# Patient Record
Sex: FEMALE | Race: WHITE | NOT HISPANIC OR LATINO | URBAN - METROPOLITAN AREA
[De-identification: names, ages, dates, MRNs, and addresses within clinical notes are randomized per-mention and may not be internally consistent; named-entity substitution may affect disease eponyms.]

---
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
## 2024-03-27 NOTE — ED PEDIATRIC NURSE NOTE - EENT WDL
HPI:  39-year-old female presents for left knee pain.  States she was walking down some stairs and thinks she might of twisted.  States did not fall or any other known injury.  Is able to walk on it despite the pain.  Denies any weakness or paresthesias.    Physical Exam:   GEN: Vitals noted. NAD  EYES:  EOMs grossly intact, anicteric sclera  RICHI: Mucosa moist.  NECK: Supple.  CARD: RRR  PULMONARY: Moving air well. Clear all lung fields.  ABDOMEN: Soft, no guarding, no rigidity. Nontender. NABS  EXTREMITIES: Full ROM, no pitting edema, mild diffuse tenderness to the medial aspect of the left knee with no erythema ecchymosis swelling or warmth, 2+ pedal pulses brisk capillary fill with intact station strength.  Negative ligamentous test  SKIN: Intact, warm and dry  NEURO: Alert and oriented x 3, speech is clear, no obvious deficits noted.       ----------------------------------------------------------------------------------------------------------------------------    MDM:  47-year-old female presenting for left knee pain after twisting injury yesterday.  On exam she is well-appearing ambulating without difficulty.  Vital signs stable.  Mild diffuse medial tenderness present with no overlying skin change or neurovascular deficits.  Will provide ibuprofen and perform plain film x-rays    ED Course as of 03/27/24 1208   Wed Mar 27, 2024   1207 X-rays unremarkable.  She is discharged with instructions to perform RICE.  Tylenol ibuprofen prescriptions provided.  To follow-up with PCP for persistent symptoms.  Return precautions reviewed [VALERIY]      ED Course User Index  [VALERIY] Earnest Galaviz PA-C         Diagnoses as of 03/27/24 1208   Acute pain of left knee     No orders to display     Labs Reviewed - No data to display    ----------------------------------------------------------------------------------------------------------------------------    This note was dictated using a speech recognition program.  While an  attempt was made at proof reading to minimize errors, minor errors in transcription may be present call for questions.     Earnest Galaviz PA-C  03/27/24 [id number]     Eyes with no visual disturbances.  Ears clean and dry and no hearing difficulties. Nose with pink mucosa and no drainage.  Mouth mucous membranes moist and pink.  No tenderness or swelling to throat or neck.